# Patient Record
Sex: FEMALE | URBAN - METROPOLITAN AREA
[De-identification: names, ages, dates, MRNs, and addresses within clinical notes are randomized per-mention and may not be internally consistent; named-entity substitution may affect disease eponyms.]

---

## 2019-10-22 ENCOUNTER — APPOINTMENT (RX ONLY)
Dept: URBAN - METROPOLITAN AREA CLINIC 168 | Facility: CLINIC | Age: 54
Setting detail: DERMATOLOGY
End: 2019-10-22

## 2019-10-22 DIAGNOSIS — L57.8 OTHER SKIN CHANGES DUE TO CHRONIC EXPOSURE TO NONIONIZING RADIATION: ICD-10-CM

## 2019-10-22 DIAGNOSIS — L60.8 OTHER NAIL DISORDERS: ICD-10-CM

## 2019-10-22 PROBLEM — M12.9 ARTHROPATHY, UNSPECIFIED: Status: ACTIVE | Noted: 2019-10-22

## 2019-10-22 PROBLEM — K21.9 GASTRO-ESOPHAGEAL REFLUX DISEASE WITHOUT ESOPHAGITIS: Status: ACTIVE | Noted: 2019-10-22

## 2019-10-22 PROCEDURE — ? ADDITIONAL NOTES

## 2019-10-22 PROCEDURE — 99202 OFFICE O/P NEW SF 15 MIN: CPT

## 2019-10-22 PROCEDURE — ? COUNSELING

## 2019-10-22 PROCEDURE — ? MEDICATION COUNSELING

## 2019-10-22 PROCEDURE — ? EDUCATIONAL RESOURCES PROVIDED

## 2019-10-22 PROCEDURE — ? PRESCRIPTION

## 2019-10-22 PROCEDURE — ? SUNSCREEN RECOMMENDATIONS

## 2019-10-22 PROCEDURE — ? PRESCRIPTION MEDICATION MANAGEMENT

## 2019-10-22 RX ORDER — TRETINOIN 1 MG/G
1 CREAM TOPICAL QHS
Qty: 1 | Refills: 3 | COMMUNITY
Start: 2019-10-22

## 2019-10-22 RX ADMIN — TRETINOIN 1: 1 CREAM TOPICAL at 13:50

## 2019-10-22 ASSESSMENT — LOCATION DETAILED DESCRIPTION DERM
LOCATION DETAILED: RIGHT THUMBNAIL
LOCATION DETAILED: RIGHT MIDDLE FINGERNAIL
LOCATION DETAILED: RIGHT SMALL FINGERNAIL
LOCATION DETAILED: LEFT INDEX FINGERNAIL
LOCATION DETAILED: LEFT SMALL FINGERNAIL
LOCATION DETAILED: LEFT RING FINGERNAIL
LOCATION DETAILED: RIGHT SUPERIOR FOREHEAD
LOCATION DETAILED: LEFT MIDDLE FINGERNAIL
LOCATION DETAILED: RIGHT RING FINGERNAIL
LOCATION DETAILED: LEFT THUMBNAIL
LOCATION DETAILED: RIGHT INDEX FINGERNAIL

## 2019-10-22 ASSESSMENT — LOCATION ZONE DERM
LOCATION ZONE: FACE
LOCATION ZONE: FINGERNAIL

## 2019-10-22 ASSESSMENT — LOCATION SIMPLE DESCRIPTION DERM
LOCATION SIMPLE: RIGHT FOREHEAD
LOCATION SIMPLE: LEFT SMALL FINGERNAIL
LOCATION SIMPLE: RIGHT RING FINGERNAIL
LOCATION SIMPLE: RIGHT INDEX FINGERNAIL
LOCATION SIMPLE: RIGHT SMALL FINGERNAIL
LOCATION SIMPLE: LEFT THUMBNAIL
LOCATION SIMPLE: LEFT INDEX FINGERNAIL
LOCATION SIMPLE: LEFT MIDDLE FINGERNAIL
LOCATION SIMPLE: LEFT RING FINGERNAIL
LOCATION SIMPLE: RIGHT MIDDLE FINGERNAIL
LOCATION SIMPLE: RIGHT THUMBNAIL

## 2019-10-22 NOTE — PROCEDURE: MEDICATION COUNSELING
Unknown if ever smoked Dapsone Pregnancy And Lactation Text: This medication is Pregnancy Category C and is not considered safe during pregnancy or breast feeding.

## 2019-10-22 NOTE — PROCEDURE: MEDICATION COUNSELING
Xelsheyz Pregnancy And Lactation Text: This medication is Pregnancy Category D and is not considered safe during pregnancy.  The risk during breast feeding is also uncertain.

## 2019-10-22 NOTE — HPI: OTHER
Condition:: Prescription
Please Describe Your Condition:: Pt states her sister told her that Retin-A is a good product for the face and now she would like a prescription for it. States she has used it and did not have peeling or dryness . Wants it for discoloration/aging of the face. Does not use SPF daily

## 2019-10-22 NOTE — PROCEDURE: SUNSCREEN RECOMMENDATIONS

## 2019-10-22 NOTE — HPI: NAIL DYSTROPHY
How Severe Is It?: moderate
Is This A New Presentation, Or A Follow-Up?: Nail Dystrophy
Additional History: Pt states the Gel helps but it still comes back. Using gloves w/ yard and wet work.

## 2019-10-22 NOTE — PROCEDURE: PRESCRIPTION MEDICATION MANAGEMENT
Detail Level: Detailed
Modify Regimen: fluocinonide gel (has this at home...ok to RF when runs out)  to nail folds/cuticles ONLY W/ INFLAMMATION/SCALING.
Render In Strict Bullet Format?: No
Plan: Gloves w/ all chemical/wet work.

## 2019-10-22 NOTE — PROCEDURE: ADDITIONAL NOTES
Additional Notes: Explained that this is not a lesion remover. The lentigenes may lighten over time but they will not disappear completely. SPF a must .
Detail Level: Detailed

## 2021-08-31 NOTE — PROCEDURE: MEDICATION COUNSELING
Per the patient she is no longer using Xeljanz.   Birth Control Pills Counseling: Birth Control Pill Counseling: I discussed with the patient the potential side effects of OCPs including but not limited to increased risk of stroke, heart attack, thrombophlebitis, deep venous thrombosis, hepatic adenomas, breast changes, GI upset, headaches, and depression.  The patient verbalized understanding of the proper use and possible adverse effects of OCPs. All of the patient's questions and concerns were addressed.